# Patient Record
Sex: FEMALE | URBAN - METROPOLITAN AREA
[De-identification: names, ages, dates, MRNs, and addresses within clinical notes are randomized per-mention and may not be internally consistent; named-entity substitution may affect disease eponyms.]

---

## 2019-02-15 NOTE — PATIENT DISCUSSION
New Prescription: atropine (atropine): drops: 1% 1 drop twice a day as directed into affected eye 02-

## 2019-02-15 NOTE — PATIENT DISCUSSION
IRITIS OD, POSTERIOR SYNECHIAE OD: ATROPINE 1% BID OD. PREDNISOLONE ACETATE 1% Q1HR TODAY, THEN QID OD. CYCLOPENTOLATE IN OFFICE INSTILLED TODAY TO TRY AND BREAK SYNECHIAE.

## 2019-02-15 NOTE — PATIENT DISCUSSION
New Prescription: prednisolone acetate (prednisolone acetate): drops,suspension: 1% 1 drop four times a day as directed into affected eye 02-

## 2019-02-18 NOTE — PATIENT DISCUSSION
IRITIS FOLLOWUP OD: SYNCHAE BROKEN, CAN STOP ATROPINE. CELLS STILL PRESENT ON TODAY'S EXAM. EXPLAINED TO PT THAT SHE HAS INFLAMMATION IN THE EYE. CONTINUE PRENISONE 4 TIMES A DAY. DO NOT WEAR CONTACTS. WILL SEE BACK IN A WEEK AND WILL TAPER DROPS. IF IRITIS BECOMES RECURRENT THEN FURTHER EVALUATION WILL BE DONE.

## 2019-02-18 NOTE — PATIENT DISCUSSION
Continue: prednisolone acetate (prednisolone acetate): drops,suspension: 1% 1 drop four times a day as directed into affected eye 02-

## 2019-05-24 ENCOUNTER — APPOINTMENT (RX ONLY)
Dept: URBAN - METROPOLITAN AREA CLINIC 57 | Facility: CLINIC | Age: 69
Setting detail: DERMATOLOGY
End: 2019-05-24

## 2019-05-24 DIAGNOSIS — L85.3 XEROSIS CUTIS: ICD-10-CM

## 2019-05-24 DIAGNOSIS — B86 SCABIES: ICD-10-CM

## 2019-05-24 PROBLEM — H91.90 UNSPECIFIED HEARING LOSS, UNSPECIFIED EAR: Status: ACTIVE | Noted: 2019-05-24

## 2019-05-24 PROBLEM — M12.9 ARTHROPATHY, UNSPECIFIED: Status: ACTIVE | Noted: 2019-05-24

## 2019-05-24 PROBLEM — L30.9 DERMATITIS, UNSPECIFIED: Status: ACTIVE | Noted: 2019-05-24

## 2019-05-24 PROBLEM — E13.9 OTHER SPECIFIED DIABETES MELLITUS WITHOUT COMPLICATIONS: Status: ACTIVE | Noted: 2019-05-24

## 2019-05-24 PROBLEM — G70.9 MYONEURAL DISORDER, UNSPECIFIED: Status: ACTIVE | Noted: 2019-05-24

## 2019-05-24 PROBLEM — H54.7 UNSPECIFIED VISUAL LOSS: Status: ACTIVE | Noted: 2019-05-24

## 2019-05-24 PROBLEM — I10 ESSENTIAL (PRIMARY) HYPERTENSION: Status: ACTIVE | Noted: 2019-05-24

## 2019-05-24 PROBLEM — F31.9 BIPOLAR DISORDER, UNSPECIFIED: Status: ACTIVE | Noted: 2019-05-24

## 2019-05-24 PROBLEM — E78.5 HYPERLIPIDEMIA, UNSPECIFIED: Status: ACTIVE | Noted: 2019-05-24

## 2019-05-24 PROBLEM — F32.9 MAJOR DEPRESSIVE DISORDER, SINGLE EPISODE, UNSPECIFIED: Status: ACTIVE | Noted: 2019-05-24

## 2019-05-24 PROCEDURE — ? INVENTORY

## 2019-05-24 PROCEDURE — ? PRESCRIPTION

## 2019-05-24 PROCEDURE — 99202 OFFICE O/P NEW SF 15 MIN: CPT

## 2019-05-24 PROCEDURE — ? ADDITIONAL NOTES

## 2019-05-24 PROCEDURE — ? COUNSELING

## 2019-05-24 RX ORDER — TRIAMCINOLONE ACETONIDE 1 MG/G
CREAM TOPICAL
Qty: 1 | Refills: 0 | Status: ERX | COMMUNITY
Start: 2019-05-24

## 2019-05-24 RX ORDER — PERMETHRIN 50 MG/G
CREAM TOPICAL QD
Qty: 2 | Refills: 3 | Status: ERX | COMMUNITY
Start: 2019-05-24

## 2019-05-24 RX ADMIN — TRIAMCINOLONE ACETONIDE: 1 CREAM TOPICAL at 14:25

## 2019-05-24 RX ADMIN — PERMETHRIN: 50 CREAM TOPICAL at 14:29

## 2019-05-24 ASSESSMENT — LOCATION DETAILED DESCRIPTION DERM
LOCATION DETAILED: RIGHT ANTERIOR PROXIMAL UPPER ARM
LOCATION DETAILED: LEFT RADIAL PALM

## 2019-05-24 ASSESSMENT — LOCATION ZONE DERM
LOCATION ZONE: ARM
LOCATION ZONE: HAND

## 2019-05-24 ASSESSMENT — LOCATION SIMPLE DESCRIPTION DERM
LOCATION SIMPLE: RIGHT UPPER ARM
LOCATION SIMPLE: LEFT HAND

## 2019-05-24 NOTE — PROCEDURE: ADDITIONAL NOTES
Additional Notes: Patient states she has had bouts of scabies since October of 2018.  Patient has been on Ivermectin four times since then.  Patient has been itching since March.  Advised patient to use the Triamcinolone bid on itchy areas.  No scabies were seen on her body.  Discussed the fact that she could be having a reaction to the previous bites.  Patient has cleaned her carpets, sheet and towels.  Use the permethrin cream on body neck down as precautionary.
Detail Level: Simple

## 2022-06-13 ENCOUNTER — NEW PATIENT (OUTPATIENT)
Dept: URBAN - METROPOLITAN AREA CLINIC 48 | Facility: CLINIC | Age: 72
End: 2022-06-13

## 2022-06-13 DIAGNOSIS — H25.13: ICD-10-CM

## 2022-06-13 DIAGNOSIS — H40.013: ICD-10-CM

## 2022-06-13 DIAGNOSIS — H16.422: ICD-10-CM

## 2022-06-13 DIAGNOSIS — H35.363: ICD-10-CM

## 2022-06-13 DIAGNOSIS — H52.4: ICD-10-CM

## 2022-06-13 DIAGNOSIS — H04.123: ICD-10-CM

## 2022-06-13 DIAGNOSIS — H43.813: ICD-10-CM

## 2022-06-13 PROCEDURE — 92004 COMPRE OPH EXAM NEW PT 1/>: CPT

## 2022-06-13 PROCEDURE — 92134 CPTRZ OPH DX IMG PST SGM RTA: CPT

## 2022-06-13 PROCEDURE — 92015 DETERMINE REFRACTIVE STATE: CPT

## 2022-06-13 ASSESSMENT — VISUAL ACUITY
OS_CC: 20/30-1
OD_GLARE: 20/30
OU_SC: 20/400
OS_GLARE: 20/40
OS_CC: J1+
OS_GLARE: 20/50
OU_CC: J1+
OU_SC: J1+
OD_CC: 20/30-1
OD_SC: J1+
OD_SC: CF 6FT
OS_SC: 20/400
OD_CC: J1+
OD_PH: 20/30+2
OS_SC: J1+
OU_CC: 20/25+2
OD_GLARE: 20/40

## 2022-06-13 ASSESSMENT — KERATOMETRY
OS_K2POWER_DIOPTERS: 46.25
OD_K2POWER_DIOPTERS: 45.75
OS_K1POWER_DIOPTERS: 44.50
OD_AXISANGLE_DEGREES: 170
OD_AXISANGLE2_DEGREES: 80
OS_AXISANGLE2_DEGREES: 85
OD_K1POWER_DIOPTERS: 44.75
OS_AXISANGLE_DEGREES: 175

## 2022-06-13 ASSESSMENT — TONOMETRY
OS_IOP_MMHG: 12
OD_IOP_MMHG: 12

## 2022-06-28 ENCOUNTER — DIAGNOSTICS ONLY (OUTPATIENT)
Dept: URBAN - METROPOLITAN AREA CLINIC 48 | Facility: CLINIC | Age: 72
End: 2022-06-28

## 2022-06-28 DIAGNOSIS — H40.013: ICD-10-CM

## 2022-06-28 PROCEDURE — 92083 EXTENDED VISUAL FIELD XM: CPT

## 2022-06-28 PROCEDURE — 92133 CPTRZD OPH DX IMG PST SGM ON: CPT

## 2022-06-28 ASSESSMENT — KERATOMETRY
OS_AXISANGLE_DEGREES: 175
OD_K1POWER_DIOPTERS: 44.75
OS_AXISANGLE2_DEGREES: 85
OS_K2POWER_DIOPTERS: 46.25
OD_K2POWER_DIOPTERS: 45.75
OD_AXISANGLE_DEGREES: 170
OD_AXISANGLE2_DEGREES: 80
OS_K1POWER_DIOPTERS: 44.50

## 2022-08-29 ENCOUNTER — DIAGNOSTICS ONLY (OUTPATIENT)
Dept: URBAN - METROPOLITAN AREA CLINIC 48 | Facility: LOCATION | Age: 72
End: 2022-08-29

## 2022-08-29 DIAGNOSIS — H40.013: ICD-10-CM

## 2022-08-29 PROCEDURE — 92082 INTERMEDIATE VISUAL FIELD XM: CPT

## 2022-08-29 ASSESSMENT — KERATOMETRY
OS_AXISANGLE_DEGREES: 175
OD_K2POWER_DIOPTERS: 45.75
OS_K1POWER_DIOPTERS: 44.50
OD_AXISANGLE_DEGREES: 170
OS_AXISANGLE2_DEGREES: 85
OD_K1POWER_DIOPTERS: 44.75
OD_AXISANGLE2_DEGREES: 80
OS_K2POWER_DIOPTERS: 46.25

## 2024-03-12 ENCOUNTER — COMPREHENSIVE EXAM (OUTPATIENT)
Dept: URBAN - METROPOLITAN AREA CLINIC 49 | Facility: LOCATION | Age: 74
End: 2024-03-12

## 2024-03-12 DIAGNOSIS — H25.812: ICD-10-CM

## 2024-03-12 DIAGNOSIS — H16.422: ICD-10-CM

## 2024-03-12 DIAGNOSIS — H25.13: ICD-10-CM

## 2024-03-12 DIAGNOSIS — E11.9: ICD-10-CM

## 2024-03-12 DIAGNOSIS — H01.003: ICD-10-CM

## 2024-03-12 DIAGNOSIS — H35.363: ICD-10-CM

## 2024-03-12 DIAGNOSIS — H52.4: ICD-10-CM

## 2024-03-12 DIAGNOSIS — H40.013: ICD-10-CM

## 2024-03-12 DIAGNOSIS — H04.123: ICD-10-CM

## 2024-03-12 DIAGNOSIS — H43.813: ICD-10-CM

## 2024-03-12 PROCEDURE — 76514 ECHO EXAM OF EYE THICKNESS: CPT

## 2024-03-12 PROCEDURE — 92134 CPTRZ OPH DX IMG PST SGM RTA: CPT

## 2024-03-12 PROCEDURE — 99214 OFFICE O/P EST MOD 30 MIN: CPT

## 2024-03-12 PROCEDURE — 92015 DETERMINE REFRACTIVE STATE: CPT

## 2024-03-12 ASSESSMENT — TONOMETRY
OS_IOP_MMHG: 17
OD_IOP_MMHG: 16
OS_IOP_MMHG: 16
OD_IOP_MMHG: 17

## 2024-03-12 ASSESSMENT — KERATOMETRY
OS_AXISANGLE_DEGREES: 175
OS_K1POWER_DIOPTERS: 44.50
OS_K2POWER_DIOPTERS: 46.25
OD_K1POWER_DIOPTERS: 44.75
OD_AXISANGLE_DEGREES: 170
OD_K2POWER_DIOPTERS: 45.75
OS_AXISANGLE2_DEGREES: 85
OD_AXISANGLE2_DEGREES: 80

## 2024-03-12 ASSESSMENT — VISUAL ACUITY
OS_GLARE: 20/60
OD_GLARE: 20/70
OS_CC: 20/40+1
OS_GLARE: 20/70
OU_CC: J2 @ 15IN
OD_PH: 20/30-2
OD_GLARE: 20/50
OD_CC: 20/40
OS_PH: 20/30-2

## 2024-03-12 ASSESSMENT — PACHYMETRY
OD_CT_UM: 520
OS_CT_UM: 525